# Patient Record
Sex: MALE | Race: WHITE | Employment: OTHER | URBAN - METROPOLITAN AREA
[De-identification: names, ages, dates, MRNs, and addresses within clinical notes are randomized per-mention and may not be internally consistent; named-entity substitution may affect disease eponyms.]

---

## 2017-01-14 ENCOUNTER — COMMUNICATION - HEALTHEAST (OUTPATIENT)
Dept: FAMILY MEDICINE | Facility: CLINIC | Age: 72
End: 2017-01-14

## 2017-01-14 DIAGNOSIS — E03.9 HYPOTHYROID: ICD-10-CM

## 2017-01-16 ENCOUNTER — COMMUNICATION - HEALTHEAST (OUTPATIENT)
Dept: FAMILY MEDICINE | Facility: CLINIC | Age: 72
End: 2017-01-16

## 2017-05-17 ENCOUNTER — RECORDS - HEALTHEAST (OUTPATIENT)
Dept: ADMINISTRATIVE | Facility: OTHER | Age: 72
End: 2017-05-17

## 2017-09-05 ENCOUNTER — COMMUNICATION - HEALTHEAST (OUTPATIENT)
Dept: FAMILY MEDICINE | Facility: CLINIC | Age: 72
End: 2017-09-05

## 2017-09-11 ENCOUNTER — OFFICE VISIT - HEALTHEAST (OUTPATIENT)
Dept: FAMILY MEDICINE | Facility: CLINIC | Age: 72
End: 2017-09-11

## 2017-09-11 DIAGNOSIS — E03.9 HYPOTHYROIDISM: ICD-10-CM

## 2017-09-11 DIAGNOSIS — Z00.00 HEALTH CARE MAINTENANCE: ICD-10-CM

## 2017-09-11 DIAGNOSIS — D72.829 ELEVATED WHITE BLOOD CELL COUNT: ICD-10-CM

## 2017-09-11 DIAGNOSIS — E78.00 HYPERCHOLESTEROLEMIA: ICD-10-CM

## 2017-09-11 DIAGNOSIS — R79.89 LOW VITAMIN B12 LEVEL: ICD-10-CM

## 2017-09-11 DIAGNOSIS — F32.A DEPRESSION: ICD-10-CM

## 2017-09-11 DIAGNOSIS — Z00.01 ENCOUNTER FOR GENERAL ADULT MEDICAL EXAMINATION WITH ABNORMAL FINDINGS: ICD-10-CM

## 2017-09-11 LAB
CHOLEST SERPL-MCNC: 243 MG/DL
FASTING STATUS PATIENT QL REPORTED: YES
HDLC SERPL-MCNC: 46 MG/DL
LDLC SERPL CALC-MCNC: 172 MG/DL
PSA SERPL-MCNC: 4.9 NG/ML (ref 0–6.5)
TRIGL SERPL-MCNC: 125 MG/DL

## 2017-09-11 ASSESSMENT — MIFFLIN-ST. JEOR: SCORE: 1614.71

## 2017-09-12 ENCOUNTER — AMBULATORY - HEALTHEAST (OUTPATIENT)
Dept: FAMILY MEDICINE | Facility: CLINIC | Age: 72
End: 2017-09-12

## 2017-09-12 ENCOUNTER — COMMUNICATION - HEALTHEAST (OUTPATIENT)
Dept: FAMILY MEDICINE | Facility: CLINIC | Age: 72
End: 2017-09-12

## 2017-09-12 DIAGNOSIS — E03.9 HYPOTHYROID: ICD-10-CM

## 2017-09-14 LAB
GLIADIN IGA SER-ACNC: 0.7 U/ML
GLIADIN IGG SER-ACNC: 0.6 U/ML
IGA SERPL-MCNC: 302 MG/DL (ref 65–400)
TTG IGA SER-ACNC: 0.6 U/ML
TTG IGG SER-ACNC: 0.8 U/ML

## 2017-09-22 ENCOUNTER — COMMUNICATION - HEALTHEAST (OUTPATIENT)
Dept: FAMILY MEDICINE | Facility: CLINIC | Age: 72
End: 2017-09-22

## 2018-01-03 ENCOUNTER — RECORDS - HEALTHEAST (OUTPATIENT)
Dept: ADMINISTRATIVE | Facility: OTHER | Age: 73
End: 2018-01-03

## 2018-01-08 ENCOUNTER — RECORDS - HEALTHEAST (OUTPATIENT)
Dept: ADMINISTRATIVE | Facility: OTHER | Age: 73
End: 2018-01-08

## 2018-01-09 ENCOUNTER — RECORDS - HEALTHEAST (OUTPATIENT)
Dept: ADMINISTRATIVE | Facility: OTHER | Age: 73
End: 2018-01-09

## 2018-01-10 ENCOUNTER — RECORDS - HEALTHEAST (OUTPATIENT)
Dept: ADMINISTRATIVE | Facility: OTHER | Age: 73
End: 2018-01-10

## 2018-01-12 ENCOUNTER — RECORDS - HEALTHEAST (OUTPATIENT)
Dept: ADMINISTRATIVE | Facility: OTHER | Age: 73
End: 2018-01-12

## 2018-02-14 ENCOUNTER — OFFICE VISIT - HEALTHEAST (OUTPATIENT)
Dept: FAMILY MEDICINE | Facility: CLINIC | Age: 73
End: 2018-02-14

## 2018-02-14 DIAGNOSIS — E55.9 VITAMIN D DEFICIENCY: ICD-10-CM

## 2018-02-14 DIAGNOSIS — R53.1 WEAKNESS: ICD-10-CM

## 2018-02-14 DIAGNOSIS — R79.89 LOW VITAMIN B12 LEVEL: ICD-10-CM

## 2018-02-14 DIAGNOSIS — D64.9 ANEMIA: ICD-10-CM

## 2018-02-14 DIAGNOSIS — R05.9 COUGH: ICD-10-CM

## 2018-02-14 DIAGNOSIS — Z86.19 HISTORY OF SALMONELLA GASTROENTERITIS: ICD-10-CM

## 2018-02-14 DIAGNOSIS — E03.9 HYPOTHYROIDISM: ICD-10-CM

## 2018-02-14 LAB
ALBUMIN SERPL-MCNC: 3.7 G/DL (ref 3.5–5)
ALP SERPL-CCNC: 66 U/L (ref 45–120)
ALT SERPL W P-5'-P-CCNC: 30 U/L (ref 0–45)
ANION GAP SERPL CALCULATED.3IONS-SCNC: 8 MMOL/L (ref 5–18)
AST SERPL W P-5'-P-CCNC: 25 U/L (ref 0–40)
BILIRUB SERPL-MCNC: 1 MG/DL (ref 0–1)
BUN SERPL-MCNC: 20 MG/DL (ref 8–28)
CALCIUM SERPL-MCNC: 9.6 MG/DL (ref 8.5–10.5)
CHLORIDE BLD-SCNC: 109 MMOL/L (ref 98–107)
CO2 SERPL-SCNC: 26 MMOL/L (ref 22–31)
CREAT SERPL-MCNC: 0.87 MG/DL (ref 0.7–1.3)
ERYTHROCYTE [DISTWIDTH] IN BLOOD BY AUTOMATED COUNT: 13.2 % (ref 11–14.5)
FERRITIN SERPL-MCNC: 261 NG/ML (ref 27–300)
GFR SERPL CREATININE-BSD FRML MDRD: >60 ML/MIN/1.73M2
GLUCOSE BLD-MCNC: 94 MG/DL (ref 70–125)
HCT VFR BLD AUTO: 43.8 % (ref 40–54)
HGB BLD-MCNC: 14.5 G/DL (ref 14–18)
MCH RBC QN AUTO: 30.2 PG (ref 27–34)
MCHC RBC AUTO-ENTMCNC: 33.2 G/DL (ref 32–36)
MCV RBC AUTO: 91 FL (ref 80–100)
PLATELET # BLD AUTO: 268 THOU/UL (ref 140–440)
PMV BLD AUTO: 7.9 FL (ref 7–10)
POTASSIUM BLD-SCNC: 5.3 MMOL/L (ref 3.5–5)
PROT SERPL-MCNC: 7.2 G/DL (ref 6–8)
RBC # BLD AUTO: 4.8 MILL/UL (ref 4.4–6.2)
SODIUM SERPL-SCNC: 143 MMOL/L (ref 136–145)
TSH SERPL DL<=0.005 MIU/L-ACNC: 3.3 UIU/ML (ref 0.3–5)
VIT B12 SERPL-MCNC: 580 PG/ML (ref 213–816)
WBC: 8.3 THOU/UL (ref 4–11)

## 2018-02-15 LAB
25(OH)D3 SERPL-MCNC: 26.6 NG/ML (ref 30–80)
25(OH)D3 SERPL-MCNC: 26.6 NG/ML (ref 30–80)

## 2018-02-16 ENCOUNTER — AMBULATORY - HEALTHEAST (OUTPATIENT)
Dept: FAMILY MEDICINE | Facility: CLINIC | Age: 73
End: 2018-02-16

## 2018-02-16 DIAGNOSIS — E87.5 HYPERKALEMIA: ICD-10-CM

## 2018-02-22 ENCOUNTER — RECORDS - HEALTHEAST (OUTPATIENT)
Dept: ADMINISTRATIVE | Facility: OTHER | Age: 73
End: 2018-02-22

## 2019-08-08 ENCOUNTER — COMMUNICATION - HEALTHEAST (OUTPATIENT)
Dept: FAMILY MEDICINE | Facility: CLINIC | Age: 74
End: 2019-08-08

## 2019-08-08 DIAGNOSIS — E03.9 HYPOTHYROID: ICD-10-CM

## 2020-11-09 ENCOUNTER — COMMUNICATION - HEALTHEAST (OUTPATIENT)
Dept: SCHEDULING | Facility: CLINIC | Age: 75
End: 2020-11-09

## 2020-11-09 ENCOUNTER — OFFICE VISIT - HEALTHEAST (OUTPATIENT)
Dept: FAMILY MEDICINE | Facility: CLINIC | Age: 75
End: 2020-11-09

## 2020-11-09 DIAGNOSIS — Z20.822 SUSPECTED COVID-19 VIRUS INFECTION: ICD-10-CM

## 2020-11-09 DIAGNOSIS — E03.9 HYPOTHYROIDISM, UNSPECIFIED TYPE: ICD-10-CM

## 2020-11-09 DIAGNOSIS — R22.40: ICD-10-CM

## 2020-11-15 ENCOUNTER — AMBULATORY - HEALTHEAST (OUTPATIENT)
Dept: FAMILY MEDICINE | Facility: CLINIC | Age: 75
End: 2020-11-15

## 2020-11-15 DIAGNOSIS — Z20.822 SUSPECTED COVID-19 VIRUS INFECTION: ICD-10-CM

## 2020-11-17 ENCOUNTER — COMMUNICATION - HEALTHEAST (OUTPATIENT)
Dept: FAMILY MEDICINE | Facility: CLINIC | Age: 75
End: 2020-11-17

## 2020-11-17 ENCOUNTER — COMMUNICATION - HEALTHEAST (OUTPATIENT)
Dept: SCHEDULING | Facility: CLINIC | Age: 75
End: 2020-11-17

## 2020-11-19 ENCOUNTER — OFFICE VISIT (OUTPATIENT)
Dept: URGENT CARE | Facility: URGENT CARE | Age: 75
End: 2020-11-19
Payer: COMMERCIAL

## 2020-11-19 ENCOUNTER — NURSE TRIAGE (OUTPATIENT)
Dept: NURSING | Facility: CLINIC | Age: 75
End: 2020-11-19

## 2020-11-19 ENCOUNTER — COMMUNICATION - HEALTHEAST (OUTPATIENT)
Dept: SCHEDULING | Facility: CLINIC | Age: 75
End: 2020-11-19

## 2020-11-19 ENCOUNTER — VIRTUAL VISIT (OUTPATIENT)
Dept: URGENT CARE | Facility: CLINIC | Age: 75
End: 2020-11-19
Payer: COMMERCIAL

## 2020-11-19 VITALS
HEART RATE: 75 BPM | HEIGHT: 71 IN | DIASTOLIC BLOOD PRESSURE: 77 MMHG | TEMPERATURE: 99.1 F | WEIGHT: 190 LBS | BODY MASS INDEX: 26.6 KG/M2 | OXYGEN SATURATION: 93 % | SYSTOLIC BLOOD PRESSURE: 115 MMHG

## 2020-11-19 DIAGNOSIS — U07.1 CLINICAL DIAGNOSIS OF COVID-19: Primary | ICD-10-CM

## 2020-11-19 PROBLEM — R41.3 MEMORY LOSS: Status: ACTIVE | Noted: 2020-11-19

## 2020-11-19 PROBLEM — H93.19 TINNITUS: Status: ACTIVE | Noted: 2020-11-19

## 2020-11-19 PROBLEM — F09 MILD COGNITIVE DISORDER: Status: ACTIVE | Noted: 2020-11-19

## 2020-11-19 PROBLEM — H90.5 SENSORINEURAL HEARING LOSS (SNHL): Status: ACTIVE | Noted: 2020-11-19

## 2020-11-19 PROBLEM — E78.00 HYPERCHOLESTEROLEMIA: Status: ACTIVE | Noted: 2017-09-11

## 2020-11-19 PROBLEM — F03.90 DEMENTIA (H): Status: ACTIVE | Noted: 2020-11-19

## 2020-11-19 PROBLEM — L29.9 PRURITIC DISORDER: Status: ACTIVE | Noted: 2020-11-19

## 2020-11-19 PROCEDURE — 99205 OFFICE O/P NEW HI 60 MIN: CPT | Performed by: NURSE PRACTITIONER

## 2020-11-19 PROCEDURE — 99207 PR NO CHARGE LOS: CPT | Performed by: INTERNAL MEDICINE

## 2020-11-19 RX ORDER — SILDENAFIL 50 MG/1
50 TABLET, FILM COATED ORAL
COMMUNITY

## 2020-11-19 RX ORDER — LEVOTHYROXINE SODIUM 25 UG/1
TABLET ORAL
COMMUNITY
Start: 2019-08-09

## 2020-11-19 ASSESSMENT — ENCOUNTER SYMPTOMS
PALPITATIONS: 0
WEAKNESS: 0
PHOTOPHOBIA: 0
FEVER: 1
TREMORS: 1
FATIGUE: 1
WHEEZING: 0
HEADACHES: 0
MYALGIAS: 0
LIGHT-HEADEDNESS: 1
NECK PAIN: 0
CHEST TIGHTNESS: 0
NECK STIFFNESS: 0
EYE DISCHARGE: 0
STRIDOR: 0
DIAPHORESIS: 1
VOMITING: 1
EYE REDNESS: 0
COUGH: 1
CHILLS: 1
EYE ITCHING: 0
CONFUSION: 0
SHORTNESS OF BREATH: 0
SORE THROAT: 0
NAUSEA: 1
RHINORRHEA: 0
APPETITE CHANGE: 1
DIZZINESS: 0
EYE PAIN: 0

## 2020-11-19 ASSESSMENT — MIFFLIN-ST. JEOR: SCORE: 1623.96

## 2020-11-20 ENCOUNTER — COMMUNICATION - HEALTHEAST (OUTPATIENT)
Dept: NURSING | Facility: CLINIC | Age: 75
End: 2020-11-20

## 2020-11-20 ENCOUNTER — AMBULATORY - HEALTHEAST (OUTPATIENT)
Dept: CARE COORDINATION | Facility: CLINIC | Age: 75
End: 2020-11-20

## 2020-11-20 DIAGNOSIS — U07.1 2019 NOVEL CORONAVIRUS DISEASE (COVID-19): ICD-10-CM

## 2020-11-20 NOTE — TELEPHONE ENCOUNTER
Triage Call:    Patients wife calling Gina. No CTC on file, obtained consent over the phone.   Gina stated that patient was tested positive for COVID-19. Wife stated she was tested positive for COVID-19 and also diagnosed with pneumonia and on antibiotics currently.     -Sx started this past Sunday. Results were back on Tuesday 11/17/2020 per wife.     Current Sx:  -Cough, constant, non productive   -mild fevers intermittently for >3 days now, current fever checked 30 min ago was 100.3  -Shakiness, intermittent since Sunday, present today intermittently  -Nausea intermittently  -Moderate decrease in appetite  -Patient is able to drink fluids and orange juice today, urinating normal.  -Patient is having moderate fatigue, patient able to walk but states he feels weaker.  -Patient has been taking Tylenol for managing fevers.     Two days ago:  Patient two days ago vomited x1. Otherwise patient has not vomited since.     Since Sunday:  -Fever   -Nausea  -Shakiness  -Cough  -Fatigue    Patient denies any chest pain, SOB, vomiting, headache, rash, loss of taste or smell, inability to stand, diarrhea, severe eye pain, head injury, abdominal pain, vomiting currently,     Per protocol, recommendations are to see PCP within 24 hours. Patients PCP is through [facility]. Wife would prefer to see if patient van get in for virtual visit [facility], otherwise if no [facility] virtual visits open RN would help assist getting patient scheduled with a virtual visit tomorrow with a provider. Speaking with , they were able to get patient in tonight for a virtual [facility] visit on the telephone at 6:30 tonight. Wife stated she would like this appointment for patient. [facility] any new or worsening sx. Wife verbalized understanding and agrees with plan.     Mimi Barnes RN, BSN Nurse Triage Advisor 6:20 PM 11/19/2020          Additional Information    Negative: Shock suspected (e.g.,  cold/pale/clammy skin, too weak to stand, low BP, rapid pulse)    Negative: Difficult to awaken or acting confused (e.g., disoriented, slurred speech)    Negative: [1] Difficulty breathing AND [2] bluish lips, tongue or face    Negative: New onset rash with multiple purple (or blood-colored) spots or dots    Negative: Sounds like a life-threatening emergency to the triager    Negative: Fever in a cancer patient who is currently (or recently) receiving chemotherapy or radiation therapy, or cancer patient who has metastatic or end-stage cancer and is receiving palliative care    Negative: Pregnant    Negative: Postpartum (from 0 to 6 weeks after delivery)    Negative: Fever onset within 24 hours of receiving VACCINE    Negative: [1] Fever AND [2] within 21 days of Ebola EXPOSURE    Negative: Other symptom is present, see that guideline  (e.g., symptoms of cough, runny nose, sore throat, earache, abdominal pain, diarrhea, vomiting)    Negative: [1] Headache AND [2] stiff neck (can't touch chin to chest)    Negative: Difficulty breathing    Negative: IV drug abuse    Negative: Patient sounds very sick or weak to the triager  (Exception: mild weakness and hasn't taken fever medicine)    Negative: [1] Drinking very little AND [2] dehydration suspected (e.g., no urine > 12 hours, very dry mouth, very lightheaded)    Fever present > 3 days (72 hours)    Negative: Fever > 104 F (40 C)    Negative: [1] Fever > 101 F (38.3 C) AND [2] age > 60    Negative: [1] Fever > 100.0 F (37.8 C) AND [2] bedridden (e.g., nursing home patient, CVA, chronic illness, recovering from surgery)    Negative: [1] Fever > 100.0 F (37.8 C) AND [2] indwelling urinary catheter (e.g., Blood, Coude)    Negative: [1] Fever > 100.0 F (37.8 C) AND [2] has port (portacath), central line, or PICC line    Negative: [1] Fever > 100.0 F (37.8 C) AND [2] diabetes mellitus or weak immune system (e.g., HIV positive, cancer chemo, splenectomy, organ transplant,  chronic steroids)    Negative: [1] Fever > 100.0 F (37.8 C) AND [2] surgery in the last month    Negative: Transplant patient (e.g., kidney, liver, lung, heart)    Negative: Severe difficulty breathing (e.g., struggling for each breath, speaks in single words)    Negative: Bluish (or gray) lips or face now    Negative: [1] Rapid onset of cough AND [2] has hives    Negative: Coughing started suddenly after medicine, an allergic food or bee sting    Negative: [1] Difficulty breathing AND [2] exposure to flames, smoke, or fumes    Negative: [1] Stridor AND [2] difficulty breathing    Negative: Sounds like a life-threatening emergency to the triager    Negative: Choked on object of food that could be caught in the throat    Negative: Chest pain is main symptom    Negative: [1] Previous asthma attacks AND [2] this feels like asthma attack    Negative: Cough lasts > 3 weeks    Negative: Wet (productive) cough (i.e., white-yellow, yellow, green, or ankur colored sputum)    Negative: Chest pain  (Exception: MILD central chest pain, present only when coughing)    Negative: Difficulty breathing    Negative: Patient sounds very sick or weak to the triager    Fever present > 3 days (72 hours)    Negative: [1] Continuous (nonstop) coughing interferes with work or school AND [2] no improvement using cough treatment per protocol    Negative: SEVERE coughing spells (e.g., whooping sound after coughing, vomiting after coughing)    Negative: [1] Coughed up blood AND [2] > 1 tablespoon (15 ml) (Exception: blood-tinged sputum)    Negative: Fever > 103 F (39.4 C)    Negative: [1] Fever > 101 F (38.3 C) AND [2] age > 60    Negative: [1] Fever > 100.0 F (37.8 C) AND [2] bedridden (e.g., nursing home patient, CVA, chronic illness, recovering from surgery)    Negative: [1] Fever > 100.0 F (37.8 C) AND [2] diabetes mellitus or weak immune system (e.g., HIV positive, cancer chemo, splenectomy, organ transplant, chronic steroids)    Negative:  "Wheezing is present    Negative: [1] Ankle swelling AND [2] swelling is increasing    Negative: Vomiting occurs only while coughing    Negative: [1] Pregnant < 20 Weeks AND [2] nausea/vomiting began in early pregnancy (i.e., 4-8 weeks pregnant)    Negative: Chest pain    Negative: Headache is main symptom    Negative: Vomiting (or Nausea) in a cancer patient who is currently (or recently) receiving chemotherapy or radiation therapy, or cancer patient who has metastatic or end-stage cancer and is receiving palliative care    Negative: [1] Vomiting AND [2] contains red blood or black (\"coffee ground\") material  (Exception: few red streaks in vomit that only happened once)    Negative: Severe pain in one eye    Negative: Recent head injury (within last 3 days)    Negative: Recent abdominal injury (within last 3 days)    Negative: [1] Insulin-dependent diabetes (Type I) AND [2] glucose > 400 mg/dl (22 mmol/l)    Negative: [1] SEVERE vomiting (e.g., 6 or more times/day) AND [2] present > 8 hours    Negative: [1] MODERATE vomiting (e.g., 3 - 5 times/day) AND [2] age > 60    Negative: Severe headache (e.g., excruciating) (Exception: similar to previous migraines)    Negative: High-risk adult (e.g., diabetes mellitus, brain tumor, V-P shunt, hernia)    Negative: [1] Drinking very little AND [2] dehydration suspected (e.g., no urine > 12 hours, very dry mouth, very lightheaded)    Negative: Patient sounds very sick or weak to the triager    Fever present > 3 days (72 hours)    Negative: [1] MILD or MODERATE vomiting AND [2] present > 48 hours (2 days) (Exception: mild vomiting with associated diarrhea)    Negative: [1] Vomiting AND [2] abdomen looks much more swollen than usual    Negative: [1] Vomiting AND [2] contains bile (green color)    Negative: [1] Constant abdominal pain AND [2] present > 2 hours    Negative: [1] Fever > 103 F (39.4 C) AND [2] not able to get the fever down using Fever Care Advice    Negative: [1] " Fever > 101 F (38.3 C) AND [2] age > 60    Negative: [1] Fever > 100.0 F (37.8 C) AND [2] bedridden (e.g., nursing home patient, CVA, chronic illness, recovering from surgery)    Negative: [1] Fever > 100.0 F (37.8 C) AND [2] weak immune system (e.g., HIV positive, cancer chemo, splenectomy, organ transplant, chronic steroids)    Negative: Taking any of the following medications: digoxin (Lanoxin), lithium, theophylline, phenytoin (Dilantin)    Negative: Shock suspected (e.g., cold/pale/clammy skin, too weak to stand, low BP, rapid pulse)    Negative: Difficult to awaken or acting confused (e.g., disoriented, slurred speech)    Negative: Sounds like a life-threatening emergency to the triager    Protocols used: FEVER-A-AH, COUGH - ACUTE NON-PRODUCTIVE-A-AH, VOMITING-A-AH    COVID 19 Nurse Triage Plan/Patient Instructions    Please be aware that novel coronavirus (COVID-19) may be circulating in the community. If you develop symptoms such as fever, cough, or SOB or if you have concerns about the presence of another infection including coronavirus (COVID-19), please contact your health care provider or visit [email or web address].     Disposition/Instructions    Virtual Visit with provider recommended. Reference Visit Selection Guide.    Thank you for taking steps to prevent the spread of this virus.  o Limit your contact with others.  o Wear a simple mask to cover your cough.  o Wash your hands well and often.    Resources    [facility]: About COVID-19: [email or web address]    CDC: What to Do If You're Sick: www.cdc.gov/coronavirus/2019-ncov/about/steps-when-sick.html    CDC: Ending Home Isolation: www.cdc.gov/coronavirus/2019-ncov/hcp/disposition-in-home-patients.html     CDC: Caring for Someone: www.cdc.gov/coronavirus/2019-ncov/if-you-are-sick/care-for-someone.html     [facility]: Interim Guidance for Hospital Discharge to Home: [email or web address]    [place]  [facility] clinical trials (COVID-19 research studies): [email or web address].[email or web address]/n-[email or web address]-trials     Below are the COVID-19 hotlines at the [facility] ([facility]). Interpreters are available.   o For health questions: Call 890-562-9190 or 1-529.567.3629 (7 a.m. to 7 p.m.)  o For questions about schools and childcare: Call [phone] or 1-487.145.2171 (7 a.m. to 7 p.m.)

## 2020-11-20 NOTE — PROGRESS NOTES
"  Lee Garvin is a 75 year old male who is being evaluated via a billable telephone visit.      The patient has been notified of following:     \"This telephone visit will be conducted via a call between you and your physician/provider. We have found that certain health care needs can be provided without the need for a physical exam.  This service lets us provide the care you need with a short phone conversation.  If a prescription is necessary we can send it directly to your pharmacy.  If lab work is needed we can place an order for that and you can then stop by our lab to have the test done at a later time.    If during the course of the call the physician/provider feels a telephone visit is not appropriate, you will not be charged for this service.\"     Patient has given verbal consent for Telephone visit?  Yes    SUBJECTIVE:  Lee Garvin is an 75 year old male who presents for covid.  Is shaky and weak today.  Was diagnosed with covid from test done four days ago.  Was having some weakness and fevers prior to test.  Has cough.  Cough worse if moves and doesn't stop coughing if moving around.  Not productive. Has had some vomiting tonight but more like dry heaves.  Not eating much, but eating some.  Also vomited two days ago.  No shortness of breath.  Fever to 100.3 tonight; fevers have been coming and going.  No headache.  Some body aches but not as bad as was 3-4 days ago.  Not have pulse ox.      PMH:  Hypothyroidism.  H/o [employer] about 4 years ago.    Social History     Socioeconomic History     Marital status:      Spouse name: Not on file     Number of children: Not on file     Years of education: Not on file     Highest education level: Not on file   Occupational History     Not on file   Social Needs     Financial resource strain: Not on file     Food insecurity     Worry: Not on file     Inability: Not on file     Transportation needs     Medical: Not on file     " Non-medical: Not on file   Tobacco Use     Smoking status: Not on file   Substance and Sexual Activity     Alcohol use: Not on file     Drug use: Not on file     Sexual activity: Not on file   Lifestyle     Physical activity     Days per week: Not on file     Minutes per session: Not on file     Stress: Not on file   Relationships     Social connections     Talks on phone: Not on file     Gets together: Not on file     Attends [employer] service: Not on file     Active member of club or organization: Not on file     Attends meetings of clubs or organizations: Not on file     Relationship status: Not on file     Intimate partner violence     Fear of current or ex partner: Not on file     Emotionally abused: Not on file     Physically abused: Not on file     Forced sexual activity: Not on file   Other Topics Concern     Not on file   Social History Narrative     Not on file     No family history on file.    ALLERGIES:  Patient has no allergy information on record.    No current outpatient medications on file.     No current facility-administered medications for this visit.          ROS:  ROS is done and is negative for general/constitutional, eye, ENT, Respiratory, cardiovascular, GI, , Skin, musculoskeletal except as noted elsewhere.  All other review of systems negative except as noted elsewhere.      OBJECTIVE:  There were no vitals taken for this visit.  GENERAL : Alert and oriented.  Did not seem to be in distress.   RESP: coughs frequently          ASSESSMENT/PLAN:    ASSESSMENT / PLAN:  (U07.1) Clinical diagnosis of COVID-19  (primary encounter diagnosis)  Comment: has had positive test and has had sxs.  Is coughing more and he feels weak and shaky.    Plan: based on his sxs, additional in person eval advised so can assess his O2, resp status, etc more closely and recommend treatment appropriately.  Discussed with pt's wife who agrees and will take him to be seen this evening.          See [facility] for orders,  medications, letters, patient instructions    Lydia Plummer MD  11/19/2020, 6:25 PM      Phone call duration:  12 minutes

## 2020-11-20 NOTE — PATIENT INSTRUCTIONS
COVID-19 positive on 11/15 with worsening fatigue and feels very lightheaded, shaky  Triaged to higher level of care given age and duration of illness  Oxygen saturation 93%  Urgent care is limited and does not have IV fluids, cannot manage hypoxia if he deteriorates  Patient's wife will drive him to [facility]

## 2020-11-20 NOTE — PROGRESS NOTES
Chief Complaint   Patient presents with     Urgent Care     Pt in clinic to have eval for fever, cough,fatigue, and vomiting.  Pt states he was diagnosed with Covid-19 on 11/15/2020.     Fever     Cough     Fatigue     Vomiting     SUBJECTIVE:  Lee Garvin is a 75 year old male who presents to the clinic today with worsening COVID-19. He was diagnosed on the 15th and has had symptoms for 12 days. His biggest concern if fatigue,  lightheadedness, shaking. He has a cough, fever, vomited a couple days ago, dry heaved 5x today. He drank a couple glasses of orange juice, lemonade and water today. Ate half a sandwich and soup. He denies shortness of breath, hemoptysis, leg swelling, syncope, chest pain, palpitations, ever smoking.    No past medical history on file.       FLUoxetine (PROZAC) 20 MG capsule, TAKE 1 CAPSULE BY MOUTH EVERY MORNING       levothyroxine (SYNTHROID/LEVOTHROID) 25 MCG tablet, TAKE ONE TABLET BY MOUTH EVERY DAY FOR LOW THYROID       acetaminophen (TYLENOL) 32 mg/mL liquid,        sildenafil (VIAGRA) 50 MG tablet, Take 50 mg by mouth    No current facility-administered medications on file prior to visit.     Social History     Tobacco Use     Smoking status: Never Smoker     Smokeless tobacco: Never Used   Substance Use Topics     Alcohol use: Not on file     No Known Allergies   No relevant family history.    Review of Systems   Constitutional: Positive for appetite change, chills, diaphoresis, fatigue and fever.   HENT: Negative for congestion, ear pain, rhinorrhea, sneezing and sore throat.    Eyes: Negative for photophobia, pain, discharge, redness, itching and visual disturbance.   Respiratory: Positive for cough. Negative for chest tightness, shortness of breath, wheezing and stridor.    Cardiovascular: Negative for chest pain, palpitations and peripheral edema.   Gastrointestinal: Positive for nausea and vomiting.   Musculoskeletal: Negative for gait problem, myalgias, neck pain  "and neck stiffness.   Skin: Negative for rash.   Allergic/Immunologic: Negative for environmental allergies.   Neurological: Positive for tremors and light-headedness. Negative for dizziness, syncope, weakness and headaches.   Psychiatric/Behavioral: Negative for confusion.     /77   Pulse 75   Temp 99.1  F (37.3  C) (Oral)   Ht 1.803 m (5' 11\")   Wt 86.2 kg (190 lb)   SpO2 93%   BMI 26.50 kg/m      Physical Exam  Vitals signs reviewed.   Constitutional:       General: He is not in acute distress.     Appearance: Normal appearance. He is ill-appearing. He is not toxic-appearing or diaphoretic.   HENT:      Head: Normocephalic and atraumatic.      Nose: No congestion or rhinorrhea.   Cardiovascular:      Rate and Rhythm: Normal rate.      Pulses: Normal pulses.   Pulmonary:      Effort: Pulmonary effort is normal. No respiratory distress.      Breath sounds: Normal breath sounds. No stridor. No wheezing, rhonchi or rales.   Chest:      Chest wall: No tenderness.   Abdominal:      General: Abdomen is flat.   Musculoskeletal: Normal range of motion.   Skin:     General: Skin is warm and dry.   Neurological:      General: No focal deficit present.      Mental Status: He is alert and oriented to person, place, and time.      Cranial Nerves: No cranial nerve deficit.      Sensory: No sensory deficit.      Motor: Weakness present.      Coordination: Coordination normal.      Gait: Gait normal.   Psychiatric:         Mood and Affect: Mood normal.         Behavior: Behavior normal.       ASSESSMENT:    ICD-10-CM    1. Clinical diagnosis of COVID-19  U07.1      PLAN:   Patient Instructions   COVID-19 positive on 11/15 with worsening fatigue and feels very lightheaded, shaky  Triaged to higher level of care given age and duration of illness  Oxygen saturation 93%  Urgent care is limited and does not have IV fluids, cannot manage hypoxia if he deteriorates  Patient's wife will drive him to [place] now    Follow " up with primary care provider with any problems, questions or concerns or if symptoms worsen or fail to improve. Patient agreed to plan and verbalized understanding.    DEVONTE Grady-BC  [facility]

## 2021-05-26 ENCOUNTER — RECORDS - HEALTHEAST (OUTPATIENT)
Dept: ADMINISTRATIVE | Facility: CLINIC | Age: 76
End: 2021-05-26

## 2021-05-31 ENCOUNTER — RECORDS - HEALTHEAST (OUTPATIENT)
Dept: ADMINISTRATIVE | Facility: CLINIC | Age: 76
End: 2021-05-31

## 2021-05-31 VITALS — BODY MASS INDEX: 27.82 KG/M2 | HEIGHT: 70 IN | WEIGHT: 194.31 LBS

## 2021-05-31 VITALS — WEIGHT: 193.19 LBS | BODY MASS INDEX: 28.12 KG/M2

## 2021-05-31 NOTE — TELEPHONE ENCOUNTER
Left message to call back for: pt  Information to relay to patient:  Left message to call and schedule medication and lab ofv with dr hoffman.

## 2021-05-31 NOTE — TELEPHONE ENCOUNTER
Refill sent on levothyroxine.  Patient is due for medication check and labs.  Please help him schedule office visit.

## 2021-05-31 NOTE — TELEPHONE ENCOUNTER
RN cannot approve Refill Request    RN can NOT refill this medication Protocol failed and NO refill given. Last office visit: 2/14/2018 Deena Gastelum MD Last Physical: 9/11/2017 Last MTM visit: Visit date not found Last visit same specialty: 2/14/2018 Deena Gastelum MD.  Next visit within 3 mo: Visit date not found  Next physical within 3 mo: Visit date not found      Tylor Jimenes, [facility] Connection Triage/Med Refill 8/8/2019    Requested Prescriptions   Pending Prescriptions Disp Refills     levothyroxine (SYNTHROID, LEVOTHROID) 25 MCG tablet [Pharmacy Med Name: Levothyroxine Sodium Oral Tablet 25 MCG] 90 tablet 2     Sig: TAKE ONE TABLET BY MOUTH EVERY DAY FOR LOW THYROID       Thyroid Hormones Protocol Failed - 8/8/2019  4:32 PM        Failed - Provider visit in past 12 months or next 3 months     Last office visit with prescriber/PCP: 2/14/2018 Deena Gastelum MD OR same dept: Visit date not found OR same specialty: 2/14/2018 Deena Gastelum MD  Last physical: 9/11/2017 Last MTM visit: Visit date not found   Next visit within 3 mo: Visit date not found  Next physical within 3 mo: Visit date not found  Prescriber OR PCP: Deena Gastelum MD  Last diagnosis associated with med order: 1. Hypothyroid  - levothyroxine (SYNTHROID, LEVOTHROID) 25 MCG tablet [Pharmacy Med Name: Levothyroxine Sodium Oral Tablet 25 MCG]; TAKE ONE TABLET BY MOUTH EVERY DAY FOR LOW THYROID  Dispense: 90 tablet; Refill: 2    If protocol passes may refill for 12 months if within 3 months of last provider visit (or a total of 15 months).             Failed - TSH on file in past 12 months for patient age 12 & older     TSH   Date Value Ref Range Status   02/14/2018 3.30 0.30 - 5.00 uIU/mL Final

## 2021-06-02 ENCOUNTER — RECORDS - HEALTHEAST (OUTPATIENT)
Dept: ADMINISTRATIVE | Facility: CLINIC | Age: 76
End: 2021-06-02

## 2021-06-12 NOTE — PROGRESS NOTES
"Lee Garvin is a 75 y.o. male who is being evaluated via a billable telephone visit.      The patient has been notified of following:     \"This telephone visit will be conducted via a call between you and your physician/provider. We have found that certain health care needs can be provided without the need for a physical exam.  This service lets us provide the care you need with a short phone conversation.  If a prescription is necessary we can send it directly to your pharmacy.  If lab work is needed we can place an order for that and you can then stop by our lab to have the test done at a later time.    Telephone visits are billed at different rates depending on your insurance coverage. During this emergency period, for some insurers they may be billed the same as an in-person visit.  Please reach out to your insurance provider with any questions.    If during the course of the call the physician/provider feels a telephone visit is not appropriate, you will not be charged for this service.\"    Patient has given verbal consent to a Telephone visit? Yes    What phone number would you like to be contacted at? [phone]     Patient would like to receive their AVS by AVS Preference: Julee.        Assessment/Plan:     1. Suspected COVID-19 virus infection  Symptomatic COVID-19 Virus (CORONAVIRUS) PCR   2. Hypothyroidism, unspecified type     3. Lump of thigh, unspecified laterality         Diagnoses and all orders for this visit:    Suspected COVID-19 virus infection  -     Symptomatic COVID-19 Virus (CORONAVIRUS) PCR; Future; Expected date: 11/09/2020  -He is having symptoms of cough and did have headache and low-grade fever this morning.  Wife has been sick with similar symptoms.  Recommend that he be tested for COVID-19.  Order is placed.  Should self isolate per CDC guidelines.  Continue with symptomatic cares.  Notify us if significant worsening of cough or development of shortness of breath or other " symptoms of concern.    Hypothyroidism, unspecified type  He is on levothyroxine.  [facility]    Lump of thigh, unspecified laterality  Discussed that it is hard to identify what this could be without evaluation in person but based on his description, suspect he may have thrombophlebitis.  Suggest application of warm or cold packs as well as short course of NSAIDs such as aspirin or ibuprofen.  If it becomes worse, would recommend evaluation in clinic.           Subjective:      Lee Garvin is a 75 y.o. male who is evaluated today via telephone encounter for concern of possible COVID-19 infection.   He has had a light cough that has been dry for the past week.  When he woke up this morning he had a headache and checked his temperature and he did have a low-grade fever of 100.7.  His headache has now resolved and his temperature has gone down but he is interested in being tested for COVID-19.  His wife has been sick for the past couple of weeks with a cough as well although her symptoms have been more severe.  He reports that his wife was tested for Covid and it was negative but she is trying to get tested again.  He states that otherwise he has been feeling pretty good.  He has not had shortness of breath.  He has not had sore throat.  No sinus pain or pressure.  No loss of taste or smell.  Ears are feeling okay.  We reviewed his medications and allergies.  He is on levothyroxine.  [facility]  He also reports a concern about a painful lump on his side.  States that he had a similar lump on his calf.  States that it is not under the skin.  It is painful to the touch.  States it has gotten a little bit better.  Review of systems is otherwise negative.  Reviewed medications and allergies noted the chart.  He has no other concerns or questions.    Current Outpatient Medications   Medication Sig Dispense Refill     ACETAMINOPHEN  (TYLENOL ORAL) Take by mouth.       FLUoxetine (PROZAC) 20 MG capsule Take 20 mg [facility] daily. [facility]       levothyroxine (SYNTHROID, LEVOTHROID) 25 MCG tablet TAKE ONE TABLET BY MOUTH EVERY DAY FOR LOW THYROID 90 tablet 0     sildenafil (VIAGRA) 50 MG tablet Take 50 mg by mouth as needed for erectile dysfunction.       ILEVRO 0.3 % DrpS        VIGAMOX 0.5 % ophthalmic solution        No current facility-administered medications for this visit.        Past Medical History, Family History, and Social History reviewed.  Past Medical History:   Diagnosis Date     Cognitive impairment      Dementia      Hearing aid worn     Bilateral     Hypothyroid      Legionnaire's disease (H) 07/2015     Low vitamin B12 level 6/14/2014     Thrombophlebitis Of Superficial Vessels Of The Lower Extremity     Created by [facility] Annotation: Dec 10 2007  4:20PM - Diana Whitehead: left side;      Past Surgical History:   Procedure Laterality Date     CATARACT EXTRACTION W/  INTRAOCULAR LENS IMPLANT Right 10/29/2015    Procedure: CATARACT EXTRACTION WITH INTRAOCULAR LENS IMPLANT RIGHT EYE;  Surgeon: Mingo Frias MD;  Location: [facility];  Service:      CATARACT EXTRACTION W/  INTRAOCULAR LENS IMPLANT Left 12/17/2015    Procedure: CATARACT EXTRACTION WITH INTRAOCULAR LENS IMPLANTATION LEFT;  Surgeon: Mingo Frias MD;  Location: [facility];  Service:      CHOLECYSTECTOMY       Patient has no known allergies.  Family History   Problem Relation Age of Onset     Cancer Mother      Cancer Father      Anesthesia problems Neg Hx      Social History     Socioeconomic History     Marital status:      Spouse name: Not on file     Number of children: Not on file     Years of education: Not on file     Highest education level: Not on file   Occupational History     Not on file   Social Needs     Financial resource strain: Not on file     Food insecurity     Worry: Not on file     Inability: Not on file      Transportation needs     Medical: Not on file     Non-medical: Not on file   Tobacco Use     Smoking status: Never Smoker     Smokeless tobacco: Never Used   Substance and Sexual Activity     Alcohol use: Yes     Alcohol/week: 1.0 standard drinks     Types: 1 Glasses of wine per week     Drug use: No     Sexual activity: Not on file   Lifestyle     Physical activity     Days per week: Not on file     Minutes per session: Not on file     Stress: Not on file   Relationships     Social connections     Talks on phone: Not on file     Gets together: Not on file     Attends Restorationist service: Not on file     Active member of club or organization: Not on file     Attends meetings of clubs or organizations: Not on file     Relationship status: Not on file     Intimate partner violence     Fear of current or ex partner: Not on file     Emotionally abused: Not on file     Physically abused: Not on file     Forced sexual activity: Not on file   Other Topics Concern     Not on file   Social History Narrative     Not on file         Review of systems is as stated in HPI, and the remainder of the 10 system review is otherwise negative.    Objective:     There were no vitals filed for this visit. There is no height or weight on file to calculate BMI.    Exam: He is alert.  He is speaking clearly.  He does not sound short of breath      Phone call duration:  9 minutes    Deena Gastelum MD

## 2021-06-12 NOTE — TELEPHONE ENCOUNTER
RN Triage:     Wife is calling, she has been sick with a fever and sinus infection for a month.   Patient now has a fever. 100.1 this am. Woke up at 3 am with a headache. He has a dry cough.     Advised an appointment to speak with a provider today about his symptoms.   Home care suggestions reviewed with wife per RN protocol    Sheila Jimenez RN, BSN Care Connection Triage Nurse    COVID 19 Nurse Triage Plan/Patient Instructions    Please be aware that novel coronavirus (COVID-19) may be circulating in the community. If you develop symptoms such as fever, cough, or SOB or if you have concerns about the presence of another infection including coronavirus (COVID-19), please contact your health care provider or visit [email or web address].     Disposition/Instructions    Virtual Visit with provider recommended. Reference Visit Selection Guide.    Thank you for taking steps to prevent the spread of this virus.  o Limit your contact with others.  o Wear a simple mask to cover your cough.  o Wash your hands well and often.    Resources    [facility]: About COVID-19: [email or web address]    CDC: What to Do If You're Sick: www.cdc.gov/coronavirus/2019-ncov/about/steps-when-sick.html    CDC: Ending Home Isolation: www.cdc.gov/coronavirus/2019-ncov/hcp/disposition-in-home-patients.html     CDC: Caring for Someone: www.cdc.gov/coronavirus/2019-ncov/if-you-are-sick/care-for-someone.html     [facility]: Interim Guidance for Hospital Discharge to Home: www.health.[facility].mn.us/diseases/coronavirus/hcp/hospdischarge.pdf    [facility] clinical trials (COVID-19 research studies): [email or web address]/umn-clinical-trials     Below are the COVID-19 hotlines at the [facility] ([facility]). Interpreters are available.   o For health questions: Call 036-853-4979 or 1-838.882.2236 (7 a.m. to 7 p.m.)  o For questions about schools and childcare: Call 919-405-7303 or 1-310.934.4794 (7 a.m. to 7 p.m.)          Reason for Disposition    HIGH RISK patient (e.g., age > 64 years, diabetes, heart or lung disease, weak immune system) (Exception: Has already been evaluated by healthcare provider and has no new or worsening symptoms)    Additional Information    Negative: SEVERE difficulty breathing (e.g., struggling for each breath, speaks in single words)    Negative: Difficult to awaken or acting confused (e.g., disoriented, slurred speech)    Negative: Bluish (or gray) lips or face now    Negative: Shock suspected (e.g., cold/pale/clammy skin, too weak to stand, low BP, rapid pulse)    Negative: Sounds like a life-threatening emergency to the triager    Negative: [1] COVID-19 exposure AND [2] no symptoms    Negative: COVID-19 and Breastfeeding, questions about    Negative: [1] Adult with possible COVID-19 symptoms AND [2] triager concerned about severity of symptoms or other causes    Negative: SEVERE or constant chest pain or pressure (Exception: mild central chest pain, present only when coughing)    Negative: MODERATE difficulty breathing (e.g., speaks in phrases, SOB even at rest, pulse 100-120)    Negative: Patient sounds very sick or weak to the triager    Negative: [1] Fever > 100.0 F (37.8 C) AND [2] bedridden (e.g., nursing home patient, CVA, chronic illness, recovering from surgery)    Negative: [1] Fever > 101 F (38.3 C) AND [2] age > 60    Negative: Fever > 103 F (39.4 C)    Negative: Chest pain or pressure    Negative: MILD difficulty breathing (e.g., minimal/no SOB at rest, SOB with walking, pulse <100)    Protocols used: CORONAVIRUS (COVID-19) DIAGNOSED OR [id number] 8.4.20

## 2021-06-12 NOTE — PROGRESS NOTES
Assessment and Plan:       1. Hypothyroidism  - Thyroid [place]  -We will plan to resume levothyroxine if TSH remains elevated.  Discussed need to follow-up again in 6-8 weeks once levothyroxine is started to recheck labs    2. Hypercholesterolemia  -Discussed healthy diet, regular exercise and weight management  - Lipid [place]  - Comprehensive Metabolic Panel    3. Depression  -Check vitamin D and TSH today.  Resume venlafaxine 37.5 mg daily for 1-2 weeks and then increase to 75 mg daily.  Discussed side effects with medication and that it will take about a month before he will start to notice a change in how he is feeling related to the medication.  Recommend follow-up in clinic in 4-5 weeks for medication check.  Notify us sooner if concerns or problems.  - Vitamin D, Total (25-Hydroxy)    4. Low vitamin B12 level  -Not currently taking a B12 supplement.  Discussed restarting one if B12 level is low  - Vitamin B12  - Celiac(Gluten)Antibody Panel    5. Health care maintenance  -Check lipids and fasting glucose today.  - PSA, Annual Screen (Prostatic-Specific Antigen)  - Ambulatory referral for Colonoscopy    6. Elevated white blood cell count  -[facility].  Will recheck hemogram today.  - HM2(CBC w/o Differential)      The patient's current medical problems were reviewed.    The following high BMI interventions were performed this visit: encouragement to exercise  The following health maintenance schedule was reviewed with the patient and provided in printed form in the after visit summary:   Health Maintenance   Topic Date Due     DEPRESSION FOLLOW UP  1945     COLONOSCOPY  04/18/1996     FALL RISK ASSESSMENT  04/18/2011     INFLUENZA VACCINE RULE BASED (1) 08/01/2017     ADVANCE DIRECTIVES DISCUSSED WITH PATIENT  10/29/2020     TD 18+ HE  02/23/2025     PNEUMOCOCCAL POLYSACCHARIDE VACCINE AGE 65 AND OVER  Completed     PNEUMOCOCCAL CONJUGATE VACCINE FOR  ADULTS (PCV13 OR PREVNAR)  Completed     ZOSTER VACCINE  Completed        Subjective:   Chief Complaint: Lee Garvin is an 72 y.o. male here for an Annual Wellness visit.   HPI: We reviewed his health history.  He is accompanied today by his wife.  He has not been seen by myself in over 2 years.  Since his last office visit he was seen at the memory loss clinic and diagnosed with mild cognitive impairment, possibly Alzheimer's disease.  He was started on a low dose of levothyroxine due to slightly elevated TSH as well as a B12 supplement and venlafaxine for treatment of depression.  He took these medications but about a year ago he discontinued them.  They did go to the [facility] for a second opinion.  The results of this second opinion were inconclusive.  [facility] did not necessarily agree with the diagnosis of mild cognitive impairment.  Recommended to wait and see approach.  He did have some repeat lab testing in May that continue to show slight elevation of the TSH as well as slightly elevated white blood cell count.  They are here today to discuss getting back on some of these medications.  His wife does report that the venlafaxine seemed to make him less irritable and less argumentative.  There are still some concerns about his memory but he attributes this to his hearing loss and not being able to hear people as well.  He does have an appointment at the [facility] for his hearing aids.  They report an episode of fecal incontinence about 1-2 months ago.  He had eaten [employer] that day and then woke up in the middle of the night having had an episode of fecal incontinence.  He has otherwise been feeling well.  Does not plan of any significant dizziness or lightheadedness.  No frequent headaches.  No chest pain or dyspnea.  No abdominal pain or heartburn.  Bowel movements are otherwise normal.  No blood in stools.  Denies urinary concerns.  Continues to have a area on his back that is  itchy at times.  Has a skin tag on his right abdomen.  No other skin concerns.  Remainder of review of systems is negative.  Reviewed medications, allergies, family and social history and updated the chart.    Review of Systems:Please see above.  The rest of the review of systems are negative for all systems.    Patient Care Team:  Deena Gastelum MD as PCP - General  Constantine Rush MD as Physician (Internal Medicine)  Rosas Villeda III, MD as Hospitalist (Internal Medicine)  Rosas Faith DO as Physician (Orthopedic Surgery)  Mingo Frias MD as Physician (Ophthalmology)  Zeenat Baca MD as Hospitalist (Internal Medicine)     Patient Active Problem List   Diagnosis     Thrombophlebitis Of Superficial Vessels Of The Lower Extremity     Common Cold     Itching (Pruritus)     Abdominal Pain In The Right Upper Belly (RUQ)     Memory Lapses Or Loss     Cognitive impairment     Hypothyroidism     Low vitamin B12 level     Chest pain     Hypothyroid     Dementia     Atypical chest pain     CAP (community acquired pneumonia)     Depression     Hypercholesterolemia     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     Created by [facility] Annotation: Dec 10 2007  4:20PM - Diana Whitehead: left side;       Past Surgical History:   Procedure Laterality Date     CATARACT EXTRACTION W/  INTRAOCULAR LENS IMPLANT Right 10/29/2015    Procedure: CATARACT EXTRACTION WITH INTRAOCULAR LENS IMPLANT RIGHT EYE;  Surgeon: Mingo Frias MD;  Location: [facility] OR;  Service:      CATARACT EXTRACTION W/  INTRAOCULAR LENS IMPLANT Left 12/17/2015    Procedure: CATARACT EXTRACTION WITH INTRAOCULAR LENS IMPLANTATION LEFT;  Surgeon: Mingo Frias MD;  Location: [facility]  "OR;  Service:      CHOLECYSTECTOMY        Family History   Problem Relation Age of Onset     Cancer Mother      Cancer Father      Anesthesia problems Neg Hx       Social History     Social History     Marital status:      Spouse name: N/A     Number of children: N/A     Years of education: N/A     Occupational History     Not on file.     Social History Main Topics     Smoking status: Never Smoker     Smokeless tobacco: Never Used     Alcohol use 0.6 oz/week     1 Glasses of wine per week     Drug use: No     Sexual activity: Not on file     Other Topics Concern     Not on file     Social History Narrative      Current Outpatient Prescriptions   Medication Sig Dispense Refill     ACETAMINOPHEN (TYLENOL ORAL) Take by mouth.       ILEVRO 0.3 % DrpS        levothyroxine (SYNTHROID) 25 MCG tablet TAKE ONE TABLET BY MOUTH EVERY DAY FOR LOW THYROID 90 tablet 0     sildenafil (VIAGRA) 50 MG tablet Take 50 mg by mouth as needed for erectile dysfunction.       venlafaxine (EFFEXOR XR) 37.5 MG 24 hr capsule Take 1 capsule by mouth once daily for 1-2 weeks. Then increase to 2 capsules daily. 60 capsule 1     VIGAMOX 0.5 % ophthalmic solution        No current facility-administered medications for this visit.       Objective:   Vital Signs:   Visit Vitals     /64 (Patient Site: Left Arm, Patient Position: Sitting, Cuff Size: Adult Large)     Pulse 64     Temp 98.7  F (37.1  C)     Ht 5' 9.5\" (1.765 m)     Wt 194 lb 5 oz (88.1 kg)     SpO2 97%     BMI 28.28 kg/m2        VisionScreening:  Not performed as patient has appt scheduled with eye specialist    PHYSICAL EXAM  General appearance: alert, appears stated age and cooperative  Head: Normocephalic, without obvious abnormality, atraumatic  Eyes: conjunctivae/corneas clear. PERRL, EOM's intact. Fundi benign.  Ears: normal TM's and external ear canals both ears  Nose: Nares normal. Septum midline. Mucosa normal. No drainage or sinus tenderness.  Throat: lips, " mucosa, and tongue normal; teeth and gums normal  Neck: no adenopathy, supple, symmetrical, trachea midline and thyroid not enlarged, symmetric, no tenderness/mass/nodules  Back: symmetric, no curvature. ROM normal. No CVA tenderness.  Lungs: clear to auscultation bilaterally  Heart: regular rate and rhythm, S1, S2 normal, no murmur, click, rub or gallop  Abdomen: soft, non-tender; bowel sounds normal; no masses,  no organomegaly  Rectal: normal tone, normal prostate, no masses or tenderness  Extremities: extremities normal, atraumatic, no cyanosis or edema  Pulses: 2+ and symmetric  Skin: Skin color, texture, turgor normal. No rashes or lesions  Lymph nodes: Cervical, supraclavicular, and axillary nodes normal.  Neurologic: Grossly normal    Assessment Results 9/11/2017   Activities of Daily Living No help needed   Instrumental Activities of Daily Living No help needed   Mini Cog Total Score 5   Some recent data might be hidden     A Mini-Cog score of 0-2 suggests the possibility of dementia, score of 3-5 suggests no dementia    Identified Health Risks:     The patient was counseled and encouraged to consider modifying their diet and eating habits. He was provided with information on recommended healthy diet options.  The patient was provided with written information regarding signs of hearing loss.  Information regarding advance directives (living nieto), including where he can download the appropriate form, was provided to the patient via the AVS.

## 2021-06-13 NOTE — PROGRESS NOTES
Clinic Care Coordination Contact  [email or web address]       Clinical Data: Care Coordinator Outreach  Outreach attempted x 2. Unable to reach patient today no vm option, no answer   Care Coordinator will do no further outreaches at this time.  Removed referral  From

## 2021-06-13 NOTE — TELEPHONE ENCOUNTER
Triage Call:    Patients wife calling Gina. No CTC on file, obtained consent over the phone.   Gina stated that patient was tested positive for COVID-19. Wife stated she was tested positive for COVID-19 and also diagnosed with pneumonia and on antibiotics currently.     -Sx started this past Sunday. Results were back on Tuesday 11/17/2020 per wife.     Current Sx:  -Cough, constant, non productive   -mild fevers intermittently for >3 days now, current fever checked 30 min ago was 100.3  -Shakiness, intermittent since Sunday, present today intermittently  -Nausea intermittently  -Moderate decrease in appetite  -Patient is able to drink fluids and orange juice today, urinating normal.  -Patient is having moderate fatigue, patient able to walk but states he feels weaker.  -Patient has been taking Tylenol for managing fevers.     Two days ago:  Patient two days ago vomited x1. Otherwise patient has not vomited since.     Since Sunday:  -Fever   -Nausea  -Shakiness  -Cough  -Fatigue    Patient denies any chest pain, SOB, vomiting, headache, rash, loss of taste or smell, inability to stand, diarrhea, severe eye pain, head injury, abdominal pain, vomiting currently,     Per protocol, recommendations are to see PCP within 24 hours. Patients PCP is through [facility]. Wife would prefer to see if patient van get in for virtual visit [facility], otherwise if no [facility] virtual visits open RN would help assist getting patient scheduled with a virtual visit tomorrow with a provider. Speaking with , they were able to get patient in tonight for a virtual [facility] visit on the telephone at 6:30 tonight. Wife stated she would like this appointment for patient. [facility] or [facility]. Wife verbalized understanding and agrees with plan.     Mimi Barnes RN, BSN Nurse Triage Advisor 6:20 PM 11/19/2020         Reason for Disposition    MILD or MODERATE vomiting (e.g., 1  "- 5 times / day)    Fever present > 3 days (72 hours)    Fever present > 3 days (72 hours)    Additional Information    Negative: Difficult to awaken or acting confused (e.g., disoriented, slurred speech)    Negative: Shock suspected (e.g., cold/pale/clammy skin, too weak to stand, low BP, rapid pulse)    Negative: Sounds like a life-threatening emergency to the triager    Negative: [1] Vomiting AND [2] contains red blood or black (\"coffee ground\") material  (Exception: few red streaks in vomit that only happened once)    Negative: Severe pain in one eye    Negative: Recent head injury (within last 3 days)    Negative: Recent abdominal injury (within last 3 days)    Negative: [1] Insulin-dependent diabetes (Type I) AND [2] glucose > 400 mg/dl (22 mmol/l)    Negative: [1] SEVERE vomiting (e.g., 6 or more times/day) AND [2] present > 8 hours    Negative: [1] MODERATE vomiting (e.g., 3 - 5 times/day) AND [2] age > 60    Negative: Severe headache (e.g., excruciating) (Exception: similar to previous migraines)    Negative: High-risk adult (e.g., diabetes mellitus, brain tumor, V-P shunt, hernia)    Negative: [1] Drinking very little AND [2] dehydration suspected (e.g., no urine > 12 hours, very dry mouth, very lightheaded)    Negative: Patient sounds very sick or weak to the triager    Negative: [1] Vomiting AND [2] abdomen looks much more swollen than usual    Negative: [1] Vomiting AND [2] contains bile (green color)    Negative: [1] Constant abdominal pain AND [2] present > 2 hours    Negative: [1] Fever > 103 F (39.4 C) AND [2] not able to get the fever down using Fever Care Advice    Negative: [1] Fever > 101 F (38.3 C) AND [2] age > 60    Negative: [1] Fever > 100.0 F (37.8 C) AND [2] bedridden (e.g., nursing home patient, CVA, chronic illness, recovering from surgery)    Negative: [1] Fever > 100.0 F (37.8 C) AND [2] weak immune system (e.g., HIV positive, cancer chemo, splenectomy, organ transplant, chronic " steroids)    Negative: Taking any of the following medications: digoxin (Lanoxin), lithium, theophylline, phenytoin (Dilantin)    Negative: [1] MILD or MODERATE vomiting AND [2] present > 48 hours (2 days) (Exception: mild vomiting with associated diarrhea)    Negative: Fever present > 3 days (72 hours)    Negative: Vomiting a prescription medication    Negative: [1] MILD vomiting with diarrhea AND [2] present > 5 days    Negative: Alcohol or drug abuse, known or suspected    Negative: Vomiting is a chronic symptom (recurrent or ongoing AND present > 4 weeks)    Negative: [1] SEVERE vomiting (e.g., 6 or more times/day, vomits everything) BUT [2] hydrated    Negative: Shock suspected (e.g., cold/pale/clammy skin, too weak to stand, low BP, rapid pulse)    Negative: Difficult to awaken or acting confused (e.g., disoriented, slurred speech)    Negative: [1] Difficulty breathing AND [2] bluish lips, tongue or face    Negative: New onset rash with multiple purple (or blood-colored) spots or dots    Negative: Sounds like a life-threatening emergency to the triager    Negative: Difficulty breathing    Negative: [1] Headache AND [2] stiff neck (can't touch chin to chest)    Negative: IV drug abuse    Negative: [1] Drinking very little AND [2] dehydration suspected (e.g., no urine > 12 hours, very dry mouth, very lightheaded)    Negative: Patient sounds very sick or weak to the triager  (Exception: mild weakness and hasn't taken fever medicine)    Negative: [1] Fever > 101 F (38.3 C) AND [2] age > 60    Negative: Fever > 104 F (40 C)    Negative: [1] Fever > 100.0 F (37.8 C) AND [2] bedridden (e.g., nursing home patient, CVA, chronic illness, recovering from surgery)    Negative: [1] Fever > 100.0 F (37.8 C) AND [2] indwelling urinary catheter (e.g., Blood, Coude)    Negative: [1] Fever > 100.0 F (37.8 C) AND [2] has port (portacath), central line, or PICC line    Negative: [1] Fever > 100.0 F (37.8 C) AND [2] diabetes  mellitus or weak immune system (e.g., HIV positive, cancer chemo, splenectomy, organ transplant, chronic steroids)    Negative: [1] Fever > 100.0 F (37.8 C) AND [2] surgery in the last month    Negative: Transplant patient (e.g., kidney, liver, lung, heart)    Negative: Chest pain  (Exception: MILD central chest pain, present only when coughing)    Negative: Difficulty breathing    Negative: Choked on object of food that could be caught in the throat    Negative: Chest pain is main symptom    Negative: [1] Previous asthma attacks AND [2] this feels like asthma attack    Negative: Cough lasts > 3 weeks    Negative: Wet (productive) cough (i.e., white-yellow, yellow, green, or ankur colored sputum)    Negative: Severe difficulty breathing (e.g., struggling for each breath, speaks in single words)    Negative: Bluish (or gray) lips or face now    Negative: [1] Rapid onset of cough AND [2] has hives    Negative: [1] Difficulty breathing AND [2] exposure to flames, smoke, or fumes    Negative: [1] Stridor AND [2] difficulty breathing    Negative: Sounds like a life-threatening emergency to the triager    Negative: Coughing started suddenly after medicine, an allergic food or bee sting    Negative: Patient sounds very sick or weak to the triager    Negative: [1] Coughed up blood AND [2] > 1 tablespoon (15 ml) (Exception: blood-tinged sputum)    Negative: Fever > 103 F (39.4 C)    Negative: [1] Fever > 100.0 F (37.8 C) AND [2] diabetes mellitus or weak immune system (e.g., HIV positive, cancer chemo, splenectomy, organ transplant, chronic steroids)    Negative: [1] Fever > 100.0 F (37.8 C) AND [2] bedridden (e.g., nursing home patient, CVA, chronic illness, recovering from surgery)    Negative: [1] Fever > 101 F (38.3 C) AND [2] age > 60    Negative: Wheezing is present    Negative: [1] Ankle swelling AND [2] swelling is increasing    Protocols used: VOMITING-A-AH, FEVER-A-AH, COUGH - ACUTE NON-PRODUCTIVE-A-AH    COVID 19  Nurse Triage Plan/Patient Instructions    Please be aware that novel coronavirus (COVID-19) may be circulating in the community. If you develop symptoms such as fever, cough, or SOB or if you have concerns about the presence of another infection including coronavirus (COVID-19), please contact your health care provider or visit [email or web address].     Disposition/Instructions    Virtual Visit with provider recommended. Reference Visit Selection Guide.    Thank you for taking steps to prevent the spread of this virus.  o Limit your contact with others.  o Wear a simple mask to cover your cough.  o Wash your hands well and often.    Resources    [facility]: About COVID-19: [email or web address]    CDC: What to Do If You're Sick: www.cdc.gov/coronavirus/2019-ncov/about/steps-when-sick.html    CDC: Ending Home Isolation: www.cdc.gov/coronavirus/2019-ncov/hcp/disposition-in-home-patients.html     CDC: Caring for Someone: www.cdc.gov/coronavirus/2019-ncov/if-you-are-sick/care-for-someone.html     [facility]: Interim Guidance for Hospital Discharge to Home: www.health.[facility].[place]/diseases/coronavirus/hcp/hospdischarge.pdf    [facility] clinical trials (COVID-19 research studies): clinicalaffairs.[email or web address]-clinical-trials     Below are the COVID-19 hotlines at the [place] Department of Health ([facility]). Interpreters are available.   o For health questions: Call 479-386-5993 or 1-464.267.2891 (7 a.m. to 7 p.m.)  o For questions about schools and childcare: Call 017-447-7652 or 1-335.538.8111 (7 a.m. to 7 p.m.)

## 2021-06-13 NOTE — TELEPHONE ENCOUNTER
----- Message from Deena Gastelum MD sent at 11/17/2020  7:45 AM CST -----  Please let pt know that COVID test is positive. Letter sent via my chart

## 2021-06-16 NOTE — PROGRESS NOTES
Assessment/Plan:     1. History of Salmonella gastroenteritis     2. Cough  XR Chest 2 Views   3. Hypothyroidism  Thyroid Stimulating Hormone (TSH)   4. Anemia  HM2(CBC w/o Differential)    Ferritin   5. Low vitamin B12 level  Vitamin B12   6. Weakness  Comprehensive Metabolic Panel   7. Vitamin D deficiency  Vitamin D, Total (25-Hydroxy)       Diagnoses and all orders for this visit:    History of Salmonella gastroenteritis  Symptoms have resolved    Cough  -     XR Chest 2 Views  -Chest x-ray is negative.  Discussed common causes of chronic cough including GERD, asthma and postnasal drainage.  He may have a little bit of airway hyperreactivity.  He is going to try to pay more attention to weather changes and exposure to dust and pollen to see if this affects his cough.  I discussed we could send him for further evaluation with 1 of her asthma/allergy specialist or with pulmonology.  He will discuss this with his wife and let me know if he would like a referral    Hypothyroidism  -     Thyroid Stimulating Hormone (TSH)  -Continue levothyroxine    Anemia  -     HM2(CBC w/o Differential)  -     Ferritin  -Recent hemogram in [place] showed mild anemia.  We will check hemogram and ferritin today as well as B12    Low vitamin B12 level  -     Vitamin B12    Weakness  -     Comprehensive Metabolic Panel  -We will check TSH and vitamin D.  Discussed that it sounds like he was quite sick while in [place] and it may just take a little bit longer for him to regain his strength following this illness.  Continue with frequent rest and healthy diet.  Will check labs as noted above.    Vitamin D deficiency  -     Vitamin D, Total (25-Hydroxy)                 Subjective:      Lee Garvin is a 72 y.o. male who comes in today for follow-up on Salmonella infection as well as due to concern about ongoing cough and weakness.  He traveled to [place] on January 3.  Was planning to stay down there until March.  Unfortunately he  became ill with nausea, vomiting and diarrhea.  Initially went to an urgent care and then went to a hospital.  He was admitted on January eighth 2018 and was discharged from the hospital on January 12, 2018.  He was treated with antibiotics.  He brings in records from his hospital stay in [place] for my review.  Stool culture was positive for Salmonella.  C. difficile testing was negative.  Urinalysis was normal.  Did not have any abnormalities of kidney or liver function or electrolytes.  White blood cell count was normal.  Did have slightly decreased hemoglobin of 13.3.  He was treated with antibiotics.  He was discharged from the hospital.  He elected to return home early because he just wanted to be in familiar surroundings and rest and eat healthy foods that he could choose himself from the grocery store.  His wife remains in [place].  She does send a note for my review today with a summary of his recent medical issues and ongoing concerns.  He did stop his antibiotic on January 20 due to side effects of stiff neck, frequent urination and chills.  He had previously been restarted on venlafaxine but stopped that due to adverse side effects.  This was stopped on January 5, 2018.  He is currently on a low dose of levothyroxine as well as a B12 and vitamin D supplement.  Main concern is that he continues to be weak and tires easily.  He is shaky.  He continues to have a dry cough.  This has been present for several months.  He also has been having a little bit of a gasp he breathes.  This seems to be positional.  Cough is not productive.  He has had a little bit of shortness of breath.  States that his vomiting and diarrhea have resolved and his bowel movements have returned to normal.  His appetite is good and he is taking fluids normally.  No longer experiencing fevers or chills.  He has not had any nausea.  He does get heartburn occasionally if he eats certain foods.  Otherwise is not bothered by frequent  heartburn.  Does not have a lot of rhinorrhea or postnasal drainage.  He has no other concerns or questions today.  Medications and allergies are reviewed and updated in the chart.    Current Outpatient Prescriptions   Medication Sig Dispense Refill     levothyroxine (SYNTHROID) 25 MCG tablet TAKE ONE TABLET BY MOUTH EVERY DAY FOR LOW THYROID 90 tablet 3     sildenafil (VIAGRA) 50 MG tablet Take 50 mg by mouth as needed for erectile dysfunction.       ACETAMINOPHEN (TYLENOL ORAL) Take by mouth.       ILEVRO 0.3 % DrpS        venlafaxine (EFFEXOR XR) 37.5 MG 24 hr capsule Take 1 capsule by mouth once daily for 1-2 weeks. Then increase to 2 capsules daily. 60 capsule 1     VIGAMOX 0.5 % ophthalmic solution        No current facility-administered medications for this visit.        Past Medical History, Family History, and Social History reviewed.  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     Created by [facility] Annotation: Dec 10 2007  4:20PM - Diana Whitehead: left side;      Past Surgical History:   Procedure Laterality Date     CATARACT EXTRACTION W/  INTRAOCULAR LENS IMPLANT Right 10/29/2015    Procedure: CATARACT EXTRACTION WITH INTRAOCULAR LENS IMPLANT RIGHT EYE;  Surgeon: Mingo Frias MD;  Location: [facility];  Service:      CATARACT EXTRACTION W/  INTRAOCULAR LENS IMPLANT Left 12/17/2015    Procedure: CATARACT EXTRACTION WITH INTRAOCULAR LENS IMPLANTATION LEFT;  Surgeon: Mingo Frias MD;  Location: [facility];  Service:      CHOLECYSTECTOMY       Review of patient's allergies indicates no known allergies.  Family History   Problem Relation Age of Onset     Cancer Mother      Cancer Father      Anesthesia problems Neg Hx      Social History     Social  History     Marital status:      Spouse name: N/A     Number of children: N/A     Years of education: N/A     Occupational History     Not on file.     Social History Main Topics     Smoking status: Never Smoker     Smokeless tobacco: Never Used     Alcohol use 0.6 oz/week     1 Glasses of wine per week     Drug use: No     Sexual activity: Not on file     Other Topics Concern     Not on file     Social History Narrative         Review of systems is as stated in HPI, and the remainder of the 10 system review is otherwise negative.    Objective:     Vitals:    02/14/18 0941   BP: 110/70   Patient Site: Right Arm   Patient Position: Sitting   Cuff Size: Adult Large   Pulse: 65   Temp: 97.8  F (36.6  C)   TempSrc: Tympanic   SpO2: 97%   Weight: 193 lb 3 oz (87.6 kg)    Body mass index is 28.12 kg/(m^2).        General appearance: alert, appears stated age and cooperative  Head: Normocephalic, without obvious abnormality, atraumatic  Eyes: conjunctivae/corneas clear. PERRL, EOM's intact. Fundi benign.  Ears: normal TM's and external ear canals both ears  Nose: Nares normal. Septum midline. Mucosa normal. No drainage or sinus tenderness.  Throat: lips, mucosa, and tongue normal; teeth and gums normal  Neck: no adenopathy, supple, symmetrical, trachea midline and thyroid not enlarged, symmetric, no tenderness/mass/nodules  Lungs: clear to auscultation bilaterally  Heart: regular rate and rhythm, S1, S2 normal, no murmur, click, rub or gallop  Abdomen: soft, non-tender; bowel sounds normal; no masses,  no organomegaly  Extremities: extremities normal, atraumatic, no cyanosis or edema    Results: Chest x-ray was performed in clinic.  This was personally reviewed.  It was negative    This note has been dictated using voice recognition software. Any grammatical or context distortions are unintentional and inherent to the the software.

## 2021-06-18 NOTE — PATIENT INSTRUCTIONS - HE
Patient Instructions by Deena Gastelum MD at 11/9/2020  1:40 PM     Author: Deena Gastelum MD Service: -- Author Type: Physician    Filed: 11/9/2020  1:57 PM Encounter Date: 11/9/2020 Status: Signed    : Deena Gastelum MD (Physician)         Patient Education     Superficial Thrombophlebitis   The superficial veins are the veins near the surface of the skin. Superficial thrombophlebitis is a problem that occurs when one or more of these veins become red, irritated, and swollen. This is most often because of a blood clot.  Causes  The problem may occur after injury to a vein. It may also occur after having an intravenous (IV) line placed. Other factors that can make the problem more likely include:    Varicose veins    Venous insufficiency    Bleeding disorders    Prolonged periods of rest and not moving around    IV drug abuse  Symptoms  Symptoms may appear in the affected area. They can include:    Pain    Tenderness    Redness    Warmth    Swelling    Hardening of the vein  In most cases, superficial thrombophlebitis resolves on its own with no problems. Treatment is focused on relieving symptoms.  Sometimes, there is a risk that the deep veins in the body may also be involved. This can lead to more serious problems. In such cases, further testing and treatments may be needed. Your healthcare provider can tell you more about this.  Home care  To help relieve pain and swelling, you may be told to:    Apply heat or cold to the affected area. Do this for up to 10 minutes as often as directed.  ? Heat: Use a warm compress, such as a heating pad.  ? Cold: Use a cold compress, such as a cold pack or bag of ice wrapped in a thin towel.    Take nonsteroidal anti-inflammatory drugs (NSAIDS), such as ibuprofen. In some cases, other pain medicines may be prescribed.    Keep the affected limb (arm or leg) raised above heart level as directed.    Wear elastic compression stockings or bandages as directed.    Don't sit or  stand for long periods. Get up and walk often.  To help treat a blood clot, a blood thinner (anticoagulant) may be prescribed. If this is needed, be sure to take the medicine exactly as directed.  Follow-up care  Follow up with your healthcare provider as advised. If imaging tests are done, they will be reviewed by a doctor. Youll be told the results and any new findings that may affect your treatment.  When to seek medical advice  Call your healthcare provider right away if any of these occur:    Fever of 100.4 F (38 C) or higher, or as directed by your healthcare provider    Increasing pain, swelling, or tenderness in the affected area    Spreading warmth or redness in the affected area  Call 911  Call 911 if any of these occur:    Trouble breathing    Chest pain or discomfort that worsens with deep breathing or coughing    Coughing (may cough up blood)    Fast or irregular heartbeat    Sweating    Anxiety    Lightheadedness, dizziness, or fainting    Extreme confusion    Extreme drowsiness or trouble waking up    New pain in the chest, arm, shoulder, neck, or upper back  Date Last Reviewed: 9/21/2015 2000-2017 [employer]. [address] [place] [address]. All rights reserved. This information is not intended as a substitute for professional medical care. Always follow your healthcare professional's instructions.           Patient Education     Understanding COVID-19 (Coronavirus Disease 2019)  COVID-19 is an illness of the lungs. It causes fever, coughing and trouble breathing. The illness is caused by a new virus in the coronavirus family called SARS-CoV-2.  First seen in late 2019, this virus has spread to many cities and countries around the world. Scientists are working to understand it better.   For the latest information, visit the CDC website at www.cdc.gov/coronavirus/2019-ncov. Or call 359-MSL-MGAI ([phone]).   How does COVID-19 spread?  The virus seems to spread and  infect people fairly easily. It may spread by:    Breathing in droplets of fluid that someone coughs or sneezes into the air.    Touching your eyes, nose or mouth after touching an infected surface. (The virus can live on handles, objects, and other surfaces.)  What are the symptoms of COVID-19?  Some people have no symptoms or only mild symptoms. Symptoms can vary from person to person. As experts learn more about COVID-19, new symptoms are being reported.  Symptoms may appear 2 to 14 days after contact with the virus. They include:    Fever or chills    Coughing    Trouble breathing or feeling short of breath    Sore throat    Stuffy or runny nose    Headache and body aches    Fatigue (feeling very tired)    Loss of appetite    Nausea or vomiting (feeling sick to your stomach or throwing up)    Diarrhea (loose, watery poop)    Abdominal (belly) pain    Loss of smell or taste  You can check your symptoms with the [facility] Coronavirus Self-.   What are possible problems from COVID-19?  In many cases, this virus can cause infection (pneumonia) in both lungs. This can make a person very ill, and it can even cause death.  Your chances of severe illness are higher if:    Youre an older adult    You have a serious health issue, such as heart or lung disease, diabetes or kidney disease    You have a health problem (or take a medicine) that suppresses the immune system  Rarely, some children develop a severe problem called MIS-C. This is similar to [place] disease, which causes blood vessels and body organs to be inflamed. We dont yet know if MIS-C happens only in children, or if adults are also at risk. We also dont know if it's related to COVID-19--many children with MIS-C test positive for the virus, but not all.  Experts continue to study MIS-C. The CDC has asked hospitals to report any person under 21 who has all of the following:     A fever over 100.4 F (38.0 C) for more than 24 hours and either a positive  COVID-19 test or exposure to the virus in the last 4 weeks    Inflammation in at least 2 organs such as the heart, lungs or kidneys, with lab tests that show inflammation    No other diagnoses besides COVID-19 explain the child's symptoms  How is COVID-19 diagnosed?  Your care team will ask about your symptoms, recent travel and contact with sick people.  Not everyone will be tested for the virus. If you need to be tested, we will use a cotton swab to take a sample of cells from your nose and throat.  You may have other tests as well, such as:    Antibody (blood test).  This test may show if youve had the virus in the past--it wont tell us if you have it right now. (It takes a few weeks for the antibodies to show up in your blood). If youve had the virus, you may have immunity (protection from the virus) in the future. We dont know yet how long you would be immune. Antibody tests arent always accurate.    Sputum test (we may collect mucus that you have coughed up from your lungs).    Chest X-ray or CT scan.  Note about immunity  We dont yet know if people can catch COVID-19 more than once. If a person who has fully recovered is re-tested within 3 months, the test may still show low levels of the virus in their body. (In other words, the test may show that they still have COVID-19, even though they arent spreading it to others.)  This doesn't mean they can't catch COVID-19 again. They may be protected from the virus for a time, but we dont know how long immunity might last.  How is COVID-19 treated?  At this time, there is no medicine to prevent or treat COVID-19. Experts are testing different medicines, trying to find one that works.  So far, the most proven treatment is to support your body while it fights the virus.    Getting extra rest.    Drink extra fluids (6 to 8 glasses of liquids each day), unless a doctor has told you not to. Ask your care team which fluids are best for you. Avoid fluids that contain  caffeine or alcohol.    Take over-the-counter (OTC) pain medicine to reduce pain and fever. Your care team will tell you which medicine to use.  If you are very sick, you may need to stay in the hospital.    We may give you fluids through a vein to keep you hydrated (IV fluids).    To make sure your body gets enough oxygen, we may give you an oxygen mask or a breathing machine (ventilator).    We may turn you onto your stomach (called prone positioning). This will increase the oxygen in your lungs.  Those who have recovered from COVID-19 may be asked to donate plasma. (This is called COVID-19 convalescent plasma donation.) The plasma may contain antibodies to help fight the virus in others. Scientists are testing whether this might be a treatment in the future. For more information, talk to your care team.  Are you at risk for COVID-19?  Some studies suggest that people without symptoms may spread COVID-19.  Youre at risk for getting COVID-19 if:    You live in (or recently traveled to) an area with a COVID-19 outbreak.    You had close contact with someone who has or may have COVID-19. Close contact means being within 6 feet, living in the same house, or visiting.  Date last modified: 10/13/2020  [employer] last reviewed this educational content on 1/1/2020  This information has been modified by your health care provider with permission from the publisher.    7151-8564 [employer]. [address] [place] [address], [place] [address]. All rights reserved. This information is not intended as a substitute for professional medical care. Always follow your healthcare professional's instructions.

## 2021-06-21 NOTE — LETTER
Letter by Deena Gastelum MD at      Author: Deena Gastelum MD Service: -- Author Type: --    Filed:  Encounter Date: 11/17/2020 Status: (Other)       11/17/2020      Lee Garvin  [address] [place] [address]          2019-nCOV (no units)   Date Value   11/15/2020 Detected, Abnormal Result (!!)       No results found for: [id number]    This letter provides a written record that you were tested for COVID-19.    Your result was positive. This means you have COVID-19 (coronavirus).    How can I protect others?If you have symptoms, stay home and away from others (self-isolate) until:Youve had no fever--and no medicine that reduces fever--for 1 full day (24 hours). And?     Your other symptoms have gotten better. For example, your cough or breathing has improved. And?    At least 10 days have passed since your symptoms started. (If you've been told by a doctor that you have a weak immune system, wait 20 days.)    If you dont have symptoms: Stay home and away from others (self-isolate) until at least 10 days have passed since your first positive COVID-19 test. If you have a weak immune system, please self-isolate for 20 days.    During this time:    Stay in your own room, including for meals. Use your own bathroom if you can.    Stay away from others in your home. No hugging, kissing or shaking hands. No visitors.     Dont go to work, school or anywhere else.     Clean high touch surfaces often (doorknobs, counters, handles, etc.). Use a household cleaning spray or wipes. Youll find a full list on the EPA website at www.epa.gov/pesticide-registration/list-n-disinfectants-use-against-sars-cov-2.     Cover your mouth and nose with a mask, tissue or washcloth to avoid spreading germs.    Wash your hands and face often with soap and water.    Caregivers in these groups are at risk for severe illness due to COVID-19:  o People 65 years and older  o People who live in a nursing home or long-term care  facility  o People with chronic disease (lung, heart, cancer, diabetes, kidney, liver, immunologic)  o People who have a weakened immune system, including those who:    Are in cancer treatment    Take medicine that weakens the immune system, such as corticosteroids    Had a bone marrow or organ transplant    Have an immune deficiency    Have poorly controlled HIV or AIDS    Are obese (body mass index of 40 or higher)    Smoke regularly    Caregivers should wear gloves while washing dishes, handling laundry and cleaning bedrooms and bathrooms.    Wash and dry laundry with special caution. Dont shake dirty laundry, and use the warmest water setting you can.    If you have a weakened immune system, ask your doctor about other actions you should take.    For more tips, go to www.cdc.gov/coronavirus/2019-ncov/downloads/10Things.pdf.    You should not go back to work until you meet the guidelines above for ending your home isolation. You don't need to be retested for COVID-19 before going back to work- studies show that you won't spread the virus if it's been at least 10 days since your symptoms started (or 20 days, if you have a weak immune system).    Employers: This document serves as formal notice of your employees medical guidelines for going back to work. They must meet the above guidelines before going back to work in person.    How can I take care of myself?    1. Get lots of rest. Drink extra fluids (unless a doctor has told you not to).    2. Take Tylenol (acetaminophen) for fever or pain. If you have liver or kidney problems, ask your family doctor if its okay to take Tylenol.     Take either:     650 mg (two 325 mg pills) every 4 to 6 hours, or?    1,000 mg (two 500 mg pills) every 8 hours as needed.     Note: Dont take more than 3,000 mg in one day. Acetaminophen is found in many medicines (both prescribed and over-the-counter medicines). Read all labels to be sure you dont take too much.    For children,  check the Tylenol bottle for the right dose (based on their age or weight).    3. If you have other health problems (like cancer, heart failure, an organ transplant or severe kidney disease): Call your specialty clinic if you dont feel better in the next 2 days.    4. Know when to call 911: Emergency warning signs include:    Trouble breathing or shortness of breath    Pain or pressure in the chest that doesnt go away    Feeling confused like you havent felt before, or not being able to wake up    Bluish-colored lips or face    5. Sign up for [employer]. We know its scary to hear that you have COVID-19. We want to track your symptoms to make sure youre okay over the next 2 weeks. Please look for an email from [employer]--this is a free, online program that well use to keep in touch. To sign up, follow the link in the email. Learn more at [email or web address].    Where can I get more information?    [facility]: [email or web address]    Coronavirus Basics: www.health.[email or web address]/diseases/coronavirus/basics.html    What to Do If Youre Sick: www.cdc.gov/coronavirus/2019-ncov/about/steps-when-sick.html    Ending Home Isolation: www.cdc.gov/coronavirus/2019-ncov/hcp/disposition-in-home-patients.html     Caring for Someone with COVID-19: www.cdc.gov/coronavirus/2019-ncov/if-you-are-sick/care-for-someone.html     [facility] clinical trials (COVID-19 research studies): [email or web address]

## 2021-08-08 ENCOUNTER — HEALTH MAINTENANCE LETTER (OUTPATIENT)
Age: 76
End: 2021-08-08

## 2021-10-03 ENCOUNTER — HEALTH MAINTENANCE LETTER (OUTPATIENT)
Age: 76
End: 2021-10-03

## 2022-09-10 ENCOUNTER — HEALTH MAINTENANCE LETTER (OUTPATIENT)
Age: 77
End: 2022-09-10

## 2023-10-01 ENCOUNTER — HEALTH MAINTENANCE LETTER (OUTPATIENT)
Age: 78
End: 2023-10-01

## 2024-11-24 ENCOUNTER — HEALTH MAINTENANCE LETTER (OUTPATIENT)
Age: 79
End: 2024-11-24